# Patient Record
(demographics unavailable — no encounter records)

---

## 2024-11-04 NOTE — PHYSICAL EXAM
[Alert] : alert [Well Nourished] : well nourished [No Acute Distress] : no acute distress [Well Developed] : well developed [Normal Lips/Tongue] : the lips and tongue were normal [No Neck Mass] : no neck mass was observed [No Thyroid Mass] : no thyroid mass [No LAD] : no lymphadenopathy [No Retractions] : no retractions [Wheezing] : no wheezing was heard [Normal Rate] : heart rate was normal  [Normal S1, S2] : normal S1 and S2 [Regular Rhythm] : with a regular rhythm [Normal Cervical Lymph Nodes] : cervical [Skin Intact] : skin intact  [No Rash] : no rash [Normal Mood] : mood was normal [Judgment and Insight Age Appropriate] : judgement and insight is age appropriate

## 2024-11-04 NOTE — REASON FOR VISIT
[Initial Consultation] : an initial consultation for [Mother] : mother [FreeTextEntry3] : food allergies

## 2024-11-04 NOTE — HISTORY OF PRESENT ILLNESS
[de-identified] : Patient of Dr. Gill since age 3.   Patient with history of asthma since age 3 - presently taking QVAR 80 2 puffs QD - and albuterol prn   He has not needed the rescue inhaler.   During the allergy seasons he will start albuterol.   His asthma worsens fall to winter - winter to spring.   His asthma worsens with cat/dog exposure.   There is one dog in the home - shedding - family lives in basement - dog is in the upstairs part of the house.   As a young child he needed many courses of prednisone to control his asthma.   Patient had constant rhinorrhea - age 3 - he had been eating all foods - ate PN - he was tested positive to PN - hazelnut - pistachio - cashew - [place] - avoids all nuts - he also had blood work.    Negative to walnut - pecan - almonds - he has occasionally these nuts.   Poppyseed:  based upon blood testing  Sesame seed: based upon blood testing - food challenge to sesame age 9 - mom noticed rhinorrhea - Dr MOSES advised to remove from diet again Sunflower seed: based upon blood testing  Pumpkin seed: based upon blood testing  He has also had food challenges and tolerated lobster - shrimp - did not complete date to try the mollusks.     Mother has an EpiPen.

## 2024-11-04 NOTE — SOCIAL HISTORY
[House] : [unfilled] lives in a house  [Radiator/Baseboard] : heating provided by radiator(s)/baseboard(s) [Central] : air conditioning provided by central unit [None] : none [Single] : single [FreeTextEntry1] : Lives with mother - grandmother Grade 8  [Bedroom] : not in the bedroom [Living Area] : not in the living area [Smokers in Household] : there are no smokers in the home

## 2024-11-04 NOTE — ASSESSMENT
[FreeTextEntry1] : Mild persistent asthma:  Continue QVAR 80 2 puffs QD Albuterol 2 puffs QID prn   Multiple food allergies:  EpiPen renewed.   Patient instructed on the proper use of an EpiPen - precautions - follow up after use of EpiPen - need to have device available at all times Patient instructed to have Benadryl, in addition to EpiPen, available at all times - reviewed indications for use of Benadryl - dosing - precautions and follow up. ImmunoCAP almond, [place] nut, cashew, cashew components, hazelnut, pistachio, pecan, macadamia, walnut, walnut components, pine nut, peanut, peanut components, sesame seed, pumpkin seed, poppyseed, sunflower seed  ImmunoCAP to crab, shrimp, lobster, scallop, squid, clam, mussel

## 2024-11-04 NOTE — HISTORY OF PRESENT ILLNESS
[de-identified] : Patient of Dr. Gill since age 3.   Patient with history of asthma since age 3 - presently taking QVAR 80 2 puffs QD - and albuterol prn   He has not needed the rescue inhaler.   During the allergy seasons he will start albuterol.   His asthma worsens fall to winter - winter to spring.   His asthma worsens with cat/dog exposure.   There is one dog in the home - shedding - family lives in basement - dog is in the upstairs part of the house.   As a young child he needed many courses of prednisone to control his asthma.   Patient had constant rhinorrhea - age 3 - he had been eating all foods - ate PN - he was tested positive to PN - hazelnut - pistachio - cashew - [place] - avoids all nuts - he also had blood work.    Negative to walnut - pecan - almonds - he has occasionally these nuts.   Poppyseed:  based upon blood testing  Sesame seed: based upon blood testing - food challenge to sesame age 9 - mom noticed rhinorrhea - Dr MOSES advised to remove from diet again Sunflower seed: based upon blood testing  Pumpkin seed: based upon blood testing  He has also had food challenges and tolerated lobster - shrimp - did not complete date to try the mollusks.     Mother has an EpiPen.

## 2024-11-13 NOTE — HISTORY OF PRESENT ILLNESS
[de-identified] : Patient here for food allergy re-evaluation - his asthma has been well controlled - taking QVAR 80 - 2 puffs in AM

## 2024-11-13 NOTE — IMPRESSION
[FreeTextEntry2] : Food skin testing positive to cashew - peanut - raspberry - scallop - sesame seed

## 2024-11-13 NOTE — HISTORY OF PRESENT ILLNESS
[de-identified] : Patient here for food allergy re-evaluation - his asthma has been well controlled - taking QVAR 80 - 2 puffs in AM

## 2024-11-13 NOTE — ASSESSMENT
[FreeTextEntry1] : Food allergies:  Based upon his history - food ImmunoCAP - skin testing - he will avoid the following:  Mollusks  PN - pistachio - cashew -  sesame seed - sunflower seed   He can introduce pecan, walnut, hazelnut, almond and all crustacea   Mild persistent asthma:  Continue QVAR 80 - 2 puffs QD   RV environmental skin testing  Re-evaluate food allergies in 1 year

## 2024-11-13 NOTE — PHYSICAL EXAM
[Alert] : alert [Well Nourished] : well nourished [No Acute Distress] : no acute distress [Well Developed] : well developed [Normal Lips/Tongue] : the lips and tongue were normal [No Neck Mass] : no neck mass was observed [No LAD] : no lymphadenopathy [No Thyroid Mass] : no thyroid mass [No Retractions] : no retractions [Wheezing] : no wheezing was heard [Normal Rate] : heart rate was normal  [Normal S1, S2] : normal S1 and S2 [Regular Rhythm] : with a regular rhythm [Normal Cervical Lymph Nodes] : cervical [Skin Intact] : skin intact  [No Rash] : no rash [Normal Mood] : mood was normal [Judgment and Insight Age Appropriate] : judgement and insight is age appropriate

## 2024-12-11 NOTE — ASSESSMENT
[FreeTextEntry1] : Mild persistent asthma:  Continue QVAR 80 - 2 puffs QD Continue albuterol QID prn  Dust mite avoidance reviewed  Multiple food allergies:  Continued introduction of hazelnut - walnut and crustacea  RV 4 months or prn

## 2024-12-11 NOTE — HISTORY OF PRESENT ILLNESS
[de-identified] : Patient has been taking QVAR - he has introduced pecan and almond and no reactions - he is presently using QVAR 80 - 2 puffs QD and he has not needed albuterol.

## 2024-12-11 NOTE — PHYSICAL EXAM
[Alert] : alert [Well Nourished] : well nourished [No Acute Distress] : no acute distress [Well Developed] : well developed [Normal Lips/Tongue] : the lips and tongue were normal [No Neck Mass] : no neck mass was observed [No LAD] : no lymphadenopathy [No Thyroid Mass] : no thyroid mass [No Retractions] : no retractions [Normal Rate] : heart rate was normal  [Normal S1, S2] : normal S1 and S2 [Regular Rhythm] : with a regular rhythm [Normal Cervical Lymph Nodes] : cervical [Skin Intact] : skin intact  [No Rash] : no rash [Normal Mood] : mood was normal [Judgment and Insight Age Appropriate] : judgement and insight is age appropriate [Wheezing] : no wheezing was heard

## 2024-12-11 NOTE — HISTORY OF PRESENT ILLNESS
[de-identified] : Patient has been taking QVAR - he has introduced pecan and almond and no reactions - he is presently using QVAR 80 - 2 puffs QD and he has not needed albuterol.

## 2024-12-11 NOTE — IMPRESSION
[_____] : trees ([unfilled]) [Allergy Testing Dog] : dog [Allergy Testing Cockroach] : cockroach [Allergy Testing Cat] : cat [] : molds [Allergy Testing Weeds] : weeds [Allergy Testing Grasses] : grasses [FreeTextEntry3] : Dog

## 2024-12-11 NOTE — IMPRESSION
[_____] : trees ([unfilled]) [Allergy Testing Cockroach] : cockroach [Allergy Testing Dog] : dog [Allergy Testing Cat] : cat [] : molds [Allergy Testing Weeds] : weeds [Allergy Testing Grasses] : grasses [FreeTextEntry3] : Dog

## 2024-12-11 NOTE — REASON FOR VISIT
[Routine Follow-Up] : a routine follow-up visit for [Mother] : mother [FreeTextEntry3] : allergies/asthma

## 2025-03-06 NOTE — HISTORY OF PRESENT ILLNESS
[de-identified] : Patient treated with QVAR 80 2 puffs QD - he reports in the AM he has worsening nasal congestion - rhinorrhea - with warmer weather his symptoms worsened . In the AM Allegra 180 mg and Zyrtec 10 mg QHS.

## 2025-03-06 NOTE — PHYSICAL EXAM
[Alert] : alert [Well Nourished] : well nourished [No Acute Distress] : no acute distress [Well Developed] : well developed [Boggy Nasal Turbinates] : boggy and/or pale nasal turbinates [Posterior Pharyngeal Cobblestoning] : no posterior pharyngeal cobblestoning [No Neck Mass] : no neck mass was observed [No LAD] : no lymphadenopathy [No Crackles] : no crackles [Wheezing] : no wheezing was heard [Normal Rate] : heart rate was normal  [Regular Rhythm] : with a regular rhythm [No Rash] : no rash [Normal Mood] : mood was normal [Judgment and Insight Age Appropriate] : judgement and insight is age appropriate

## 2025-03-06 NOTE — ASSESSMENT
[FreeTextEntry1] : Perennial allergic rhinitis:  Add Flonase 2 puffs QD each nostril  Azelastine 2 puffs QHS for 5 days than stop  Continue Allegra 180 mg QD Should be able to stop Zyrtec in 5 days when Flonase kicks in   Mild persistent asthma:  Continue QVAR 80 2 puffs QD  Continue albuterol 2 puffs QID prn

## 2025-03-06 NOTE — HISTORY OF PRESENT ILLNESS
[de-identified] : Patient treated with QVAR 80 2 puffs QD - he reports in the AM he has worsening nasal congestion - rhinorrhea - with warmer weather his symptoms worsened . In the AM Allegra 180 mg and Zyrtec 10 mg QHS.

## 2025-04-07 NOTE — ASSESSMENT
[FreeTextEntry1] : Perennial allergic rhinitis:  Add Flonase 2 puffs QD each nostril  Azelastine 2 puffs QHS for 5 days than stop  Continue Allegra 180 mg QD  Mild persistent asthma:  Continue QVAR 80 2 puffs QD  Continue albuterol 2 puffs QID prn   Multiple food allergies:  Introduce crustacea and hazelnut into diet   RV 4 months

## 2025-04-07 NOTE — HISTORY OF PRESENT ILLNESS
[de-identified] : Patient taking QVAR 80 - 2 puffs QD - he has introduced walnut with no reaction - he does not like shrimp and hesitates to eat crustacea - no hazelnut as of yet as well.    No use of Albuterol.

## 2025-04-07 NOTE — HISTORY OF PRESENT ILLNESS
[de-identified] : Patient taking QVAR 80 - 2 puffs QD - he has introduced walnut with no reaction - he does not like shrimp and hesitates to eat crustacea - no hazelnut as of yet as well.    No use of Albuterol.

## 2025-04-07 NOTE — PHYSICAL EXAM
[Alert] : alert [Well Nourished] : well nourished [No Acute Distress] : no acute distress [Well Developed] : well developed [Boggy Nasal Turbinates] : boggy and/or pale nasal turbinates [No Neck Mass] : no neck mass was observed [No LAD] : no lymphadenopathy [No Crackles] : no crackles [Normal Rate] : heart rate was normal  [Regular Rhythm] : with a regular rhythm [No Rash] : no rash [Normal Mood] : mood was normal [Judgment and Insight Age Appropriate] : judgement and insight is age appropriate [Posterior Pharyngeal Cobblestoning] : no posterior pharyngeal cobblestoning [Wheezing] : no wheezing was heard